# Patient Record
Sex: MALE | Race: OTHER | NOT HISPANIC OR LATINO | URBAN - METROPOLITAN AREA
[De-identification: names, ages, dates, MRNs, and addresses within clinical notes are randomized per-mention and may not be internally consistent; named-entity substitution may affect disease eponyms.]

---

## 2019-02-02 ENCOUNTER — EMERGENCY (EMERGENCY)
Facility: HOSPITAL | Age: 74
LOS: 1 days | Discharge: HOME | End: 2019-02-02
Attending: EMERGENCY MEDICINE | Admitting: EMERGENCY MEDICINE
Payer: SELF-PAY

## 2019-02-02 VITALS
RESPIRATION RATE: 18 BRPM | OXYGEN SATURATION: 98 % | TEMPERATURE: 98 F | SYSTOLIC BLOOD PRESSURE: 116 MMHG | HEART RATE: 89 BPM | WEIGHT: 160.06 LBS | DIASTOLIC BLOOD PRESSURE: 74 MMHG

## 2019-02-02 VITALS
HEART RATE: 87 BPM | TEMPERATURE: 99 F | DIASTOLIC BLOOD PRESSURE: 67 MMHG | SYSTOLIC BLOOD PRESSURE: 115 MMHG | RESPIRATION RATE: 16 BRPM | OXYGEN SATURATION: 99 %

## 2019-02-02 LAB
ALBUMIN SERPL ELPH-MCNC: 2.9 G/DL — LOW (ref 3.4–5)
ALP SERPL-CCNC: 117 U/L — SIGNIFICANT CHANGE UP (ref 40–120)
ALT FLD-CCNC: 20 U/L — SIGNIFICANT CHANGE UP (ref 12–42)
ANION GAP SERPL CALC-SCNC: 9 MMOL/L — SIGNIFICANT CHANGE UP (ref 9–16)
AST SERPL-CCNC: 16 U/L — SIGNIFICANT CHANGE UP (ref 15–37)
BASOPHILS NFR BLD AUTO: 0.4 % — SIGNIFICANT CHANGE UP (ref 0–2)
BILIRUB SERPL-MCNC: 0.4 MG/DL — SIGNIFICANT CHANGE UP (ref 0.2–1.2)
BUN SERPL-MCNC: 48 MG/DL — HIGH (ref 7–23)
CALCIUM SERPL-MCNC: 9 MG/DL — SIGNIFICANT CHANGE UP (ref 8.5–10.5)
CHLORIDE SERPL-SCNC: 95 MMOL/L — LOW (ref 96–108)
CO2 SERPL-SCNC: 25 MMOL/L — SIGNIFICANT CHANGE UP (ref 22–31)
CREAT SERPL-MCNC: 2.9 MG/DL — HIGH (ref 0.5–1.3)
EOSINOPHIL NFR BLD AUTO: 0.5 % — SIGNIFICANT CHANGE UP (ref 0–6)
GLUCOSE SERPL-MCNC: 300 MG/DL — HIGH (ref 70–99)
HCT VFR BLD CALC: 41.6 % — SIGNIFICANT CHANGE UP (ref 39–50)
HGB BLD-MCNC: 14 G/DL — SIGNIFICANT CHANGE UP (ref 13–17)
IMM GRANULOCYTES NFR BLD AUTO: 0.5 % — SIGNIFICANT CHANGE UP (ref 0–1.5)
LYMPHOCYTES # BLD AUTO: 13.4 % — SIGNIFICANT CHANGE UP (ref 13–44)
MCHC RBC-ENTMCNC: 25.7 PG — LOW (ref 27–34)
MCHC RBC-ENTMCNC: 33.7 G/DL — SIGNIFICANT CHANGE UP (ref 32–36)
MCV RBC AUTO: 76.5 FL — LOW (ref 80–100)
MONOCYTES NFR BLD AUTO: 9.9 % — SIGNIFICANT CHANGE UP (ref 2–14)
NEUTROPHILS NFR BLD AUTO: 75.3 % — SIGNIFICANT CHANGE UP (ref 43–77)
PLATELET # BLD AUTO: 393 K/UL — SIGNIFICANT CHANGE UP (ref 150–400)
POTASSIUM SERPL-MCNC: 4.3 MMOL/L — SIGNIFICANT CHANGE UP (ref 3.5–5.3)
POTASSIUM SERPL-SCNC: 4.3 MMOL/L — SIGNIFICANT CHANGE UP (ref 3.5–5.3)
PROT SERPL-MCNC: 8 G/DL — SIGNIFICANT CHANGE UP (ref 6.4–8.2)
RBC # BLD: 5.44 M/UL — SIGNIFICANT CHANGE UP (ref 4.2–5.8)
RBC # FLD: 13.1 % — SIGNIFICANT CHANGE UP (ref 10.3–14.5)
SODIUM SERPL-SCNC: 129 MMOL/L — LOW (ref 132–145)
WBC # BLD: 10 K/UL — SIGNIFICANT CHANGE UP (ref 3.8–10.5)
WBC # FLD AUTO: 10 K/UL — SIGNIFICANT CHANGE UP (ref 3.8–10.5)

## 2019-02-02 PROCEDURE — 73700 CT LOWER EXTREMITY W/O DYE: CPT | Mod: 26,RT

## 2019-02-02 PROCEDURE — 93971 EXTREMITY STUDY: CPT | Mod: 26,RT

## 2019-02-02 PROCEDURE — 99285 EMERGENCY DEPT VISIT HI MDM: CPT

## 2019-02-02 RX ORDER — CLOPIDOGREL BISULFATE 75 MG/1
1 TABLET, FILM COATED ORAL
Qty: 0 | Refills: 0 | COMMUNITY

## 2019-02-02 RX ORDER — ESOMEPRAZOLE MAGNESIUM 40 MG/1
0 CAPSULE, DELAYED RELEASE ORAL
Qty: 0 | Refills: 0 | COMMUNITY

## 2019-02-02 RX ORDER — MORPHINE SULFATE 50 MG/1
2 CAPSULE, EXTENDED RELEASE ORAL ONCE
Qty: 0 | Refills: 0 | Status: DISCONTINUED | OUTPATIENT
Start: 2019-02-02 | End: 2019-02-02

## 2019-02-02 RX ORDER — LISINOPRIL 2.5 MG/1
0 TABLET ORAL
Qty: 0 | Refills: 0 | COMMUNITY

## 2019-02-02 RX ORDER — CEFAZOLIN SODIUM 1 G
1000 VIAL (EA) INJECTION ONCE
Qty: 0 | Refills: 0 | Status: COMPLETED | OUTPATIENT
Start: 2019-02-02 | End: 2019-02-02

## 2019-02-02 RX ORDER — KETOROLAC TROMETHAMINE 30 MG/ML
30 SYRINGE (ML) INJECTION ONCE
Qty: 0 | Refills: 0 | Status: DISCONTINUED | OUTPATIENT
Start: 2019-02-02 | End: 2019-02-02

## 2019-02-02 RX ORDER — ATORVASTATIN CALCIUM 80 MG/1
0 TABLET, FILM COATED ORAL
Qty: 0 | Refills: 0 | COMMUNITY

## 2019-02-02 RX ORDER — CEPHALEXIN 500 MG
1 CAPSULE ORAL
Qty: 20 | Refills: 0 | OUTPATIENT
Start: 2019-02-02 | End: 2019-02-11

## 2019-02-02 RX ADMIN — Medication 30 MILLIGRAM(S): at 13:05

## 2019-02-02 RX ADMIN — Medication 100 MILLIGRAM(S): at 16:14

## 2019-02-02 RX ADMIN — MORPHINE SULFATE 2 MILLIGRAM(S): 50 CAPSULE, EXTENDED RELEASE ORAL at 13:06

## 2019-02-02 RX ADMIN — Medication 30 MILLIGRAM(S): at 16:14

## 2019-02-02 RX ADMIN — MORPHINE SULFATE 2 MILLIGRAM(S): 50 CAPSULE, EXTENDED RELEASE ORAL at 16:14

## 2019-02-02 RX ADMIN — Medication 1000 MILLIGRAM(S): at 16:46

## 2019-02-02 NOTE — ED PROVIDER NOTE - OBJECTIVE STATEMENT
Present for HPI: Ale Darby (scribe), Kaiden Fagan (pt)     Nurse's Note: "here for right upper leg pain, denies injury or trauma - as per EMS pt is unable to bear weight h/o dm, htn"    73 y o, homeless, male with PMHx of DM (Janumet), HTN, MI, and PSHX of cardiac surgery (2005- [facility]) was BIBA for right medial thigh pain x1 week.  Notes mild improvement since initial onset but states that pain is still present. States he thinks pain may have been caused by a direct hit to site while crossing a subway turnstile. Pt does medical care check ups at [facility] but denies seeing a doctor for current complaint. Notes difficulty walking secondary to pain and chills. No fevers, chest pain, SOB, numbness, tingling, edema, back pain, or other sx.

## 2019-02-02 NOTE — ED PROVIDER NOTE - NSFOLLOWUPINSTRUCTIONS_ED_ALL_ED_FT
1)  PLEASE FOLLOW-UP WITH YOUR PRIMARY CARE DOCTOR IN 1-2 DAYS at [place]    2)  BRING ALL PAPERWORK FROM TODAY'S VISIT TO YOUR FOLLOW-UP VISIT.  IF YOU DO NOT HAVE A PRIMARY CARE DOCTOR PLEASE REFER TO THE OFFICE/CLINIC INFORMATION GIVEN ABOVE.  YOU MAY ALSO CALL [phone] AND ASK FOR MS. SHEMAR KULKARNI.  SHE CAN HELP YOU MAKE A FOLLOW-UP APPOINTMENT.  HER HOURS ARE 11AM-7PM MONDAY - FRIDAY.    3) PLEASE RETURN TO THE ER IMMEDIATELY OR CALL 911 FOR ANY HIGH FEVER, TROUBLE BREATHING, VOMITING, SEVERE PAIN, WORSENING OF LEG SYMPTOMS , FEVER OR ANY OTHER CONCERNS.

## 2019-02-02 NOTE — ED ADULT TRIAGE NOTE - CHIEF COMPLAINT QUOTE
here for right upper leg pain, denies injury or trauma - as per EMS pt is unable to bear weight h/o dm, htn

## 2019-02-02 NOTE — ED PROVIDER NOTE - MEDICAL DECISION MAKING DETAILS
73 y o male w/ cardaic hx presents with right inguinal to right medial thigh tenderness. No edema. Analgesics ordered. Check labs. CT of right thigh ordered to check for DVT or inguinal absess. US of right leg ordered. 73 y o male w/ cardiac hx presents with right inguinal to right medial thigh tenderness. No edema. Analgesics ordered. Check labs. CT of right thigh ordered to check for DVT or inguinal abscess. US of right leg ordered.  Strict prompt return precautions to the Emergency Room discussed for any worsening or new symptoms. The patient verbalized understanding of these precautions and need to return. The patient tolerated PO fluids.  . At the time of discharge from the Emergency Department, the patient is alert with fluent appropriate speech and ambulatory without difficulty.  A medical screening examination was performed and no emergency medical condition was identified.    I have discussed the discharge plan with the patient. The patient agrees with the plan, as discussed.  The patient understands Emergency Department diagnosis is a preliminary diagnosis often based on limited information and that the patient must adhere to the follow-up plan as discussed.  The patient understands that if the symptoms worsen or if prescribed medications do not have the desired/planned effect that the patient may return to the Emergency Department at any time for further evaluation and treatment.

## 2019-02-02 NOTE — ED PROVIDER NOTE - MUSCULOSKELETAL, MLM
Left leg ROM fully intact. Right leg ROM limited secondary to pain, tenderness in right medial thigh from right knee to right inguinal area. Left leg ROM fully intact. + tenderness / mild erythema / induration - no fluctuance right medial thigh from inguinal extending down medial thigh - approx 3cm x 10 cm no crepitus

## 2019-02-06 DIAGNOSIS — M79.651 PAIN IN RIGHT THIGH: ICD-10-CM

## 2019-02-06 DIAGNOSIS — E11.9 TYPE 2 DIABETES MELLITUS WITHOUT COMPLICATIONS: ICD-10-CM

## 2019-02-06 DIAGNOSIS — I10 ESSENTIAL (PRIMARY) HYPERTENSION: ICD-10-CM

## 2019-02-06 DIAGNOSIS — Z79.4 LONG TERM (CURRENT) USE OF INSULIN: ICD-10-CM

## 2019-02-06 DIAGNOSIS — L03.113 CELLULITIS OF RIGHT UPPER LIMB: ICD-10-CM

## 2019-02-06 DIAGNOSIS — Z79.02 LONG TERM (CURRENT) USE OF ANTITHROMBOTICS/ANTIPLATELETS: ICD-10-CM

## 2019-02-06 DIAGNOSIS — Z79.899 OTHER LONG TERM (CURRENT) DRUG THERAPY: ICD-10-CM

## 2024-07-29 ENCOUNTER — EMERGENCY (EMERGENCY)
Facility: HOSPITAL | Age: 79
LOS: 1 days | Discharge: HOME | End: 2024-07-29
Attending: EMERGENCY MEDICINE | Admitting: EMERGENCY MEDICINE
Payer: MEDICAID

## 2024-07-29 VITALS
OXYGEN SATURATION: 96 % | HEART RATE: 80 BPM | TEMPERATURE: 98 F | SYSTOLIC BLOOD PRESSURE: 135 MMHG | DIASTOLIC BLOOD PRESSURE: 74 MMHG | RESPIRATION RATE: 18 BRPM

## 2024-07-29 VITALS
DIASTOLIC BLOOD PRESSURE: 91 MMHG | HEART RATE: 78 BPM | TEMPERATURE: 98 F | SYSTOLIC BLOOD PRESSURE: 152 MMHG | RESPIRATION RATE: 18 BRPM | OXYGEN SATURATION: 98 %

## 2024-07-29 DIAGNOSIS — S50.312A ABRASION OF LEFT ELBOW, INITIAL ENCOUNTER: ICD-10-CM
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
## 2024-07-29 DIAGNOSIS — S50.02XA CONTUSION OF LEFT ELBOW, INITIAL ENCOUNTER: ICD-10-CM

## 2024-07-29 DIAGNOSIS — Z79.02 LONG TERM (CURRENT) USE OF ANTITHROMBOTICS/ANTIPLATELETS: ICD-10-CM

## 2024-07-29 DIAGNOSIS — I25.10 ATHEROSCLEROTIC HEART DISEASE OF NATIVE CORONARY ARTERY WITHOUT ANGINA PECTORIS: ICD-10-CM

## 2024-07-29 PROBLEM — I21.9 ACUTE MYOCARDIAL INFARCTION, UNSPECIFIED: Chronic | Status: ACTIVE | Noted: 2019-02-02

## 2024-07-29 PROBLEM — E11.9 TYPE 2 DIABETES MELLITUS WITHOUT COMPLICATIONS: Chronic | Status: ACTIVE | Noted: 2019-02-02

## 2024-07-29 PROBLEM — I10 ESSENTIAL (PRIMARY) HYPERTENSION: Chronic | Status: ACTIVE | Noted: 2019-02-02

## 2024-07-29 PROCEDURE — 99284 EMERGENCY DEPT VISIT MOD MDM: CPT

## 2024-07-29 PROCEDURE — 73590 X-RAY EXAM OF LOWER LEG: CPT | Mod: 26,LT

## 2024-07-29 PROCEDURE — 73630 X-RAY EXAM OF FOOT: CPT | Mod: 26,50

## 2024-07-29 PROCEDURE — 73080 X-RAY EXAM OF ELBOW: CPT | Mod: 26,LT

## 2024-07-29 RX ADMIN — Medication 600 MILLIGRAM(S): at 06:59

## 2024-07-29 NOTE — ED PROVIDER NOTE - OBJECTIVE STATEMENT
78-year-old male with past medical history of CAD on Plavix presenting to the emergency room for upper and lower extremity injury.  Patient states that he is asleep walker, [place] states that he started sleepwalking and then lowered himself onto the train tracks accidentally, states that he fell injuring his left elbow.  States that while he slid onto the tracks, he landed on his feet, complaining of feet pain bilaterally as well as left leg pain.  States the majority of the pain is in his elbow.  Patient denies hitting head.

## 2024-07-29 NOTE — ED ADULT NURSE NOTE - NSFALLRISKASMT_ED_ALL_ED_DT
SUBJECTIVE: Patient states she is still sore from her fall but soreness has improved since last visit.     OBJECTIVE: Patient continues with right knee pain due to fall last week. she is able to stand today but not able to safely take steps due to pain and weakness.    MEDICATION CHANGES: none    FALLS SINCE LAST VISIT: none    CHANGES TO INSURANCE: none    MENTAL STATUS: alert and oriented    PAIN: 5-6/10 right knee    EDEMA: B LE's    SUPPLEMENTAL OXYGEN: NO    PLAN: attempt gait and transfer 29-Jul-2024 06:29

## 2024-07-29 NOTE — ED PROVIDER NOTE - PATIENT PORTAL LINK FT
You can access the FollowMyHealth Patient Portal offered by [facility] by registering at the following website: [email or web address]. By joining [employer]’s FollowMyHealth portal, you will also be able to view your health information using other applications (apps) compatible with our system.

## 2024-07-29 NOTE — ED ADULT TRIAGE NOTE - CHIEF COMPLAINT QUOTE
[place] of fall [place] subway platform [place] train tracks. Patient reports he sleep walks and believes he sleepwalked and slid himself down onto tracks and fell. Abrasion noted to left elbow and pain to left ankle and knee. Denies head injury or LOC. Endorses that he takes plavix.

## 2024-07-29 NOTE — ED PROVIDER NOTE - PHYSICAL EXAMINATION
Constitutional:  Well appearing, awake, alert, oriented  and in no apparent distress.  HEENT: Airway patent, Nasal mucosa clear. Mouth with normal mucosa. No evidence of head trauma  Eyes: Clear bilaterally, pupils equal, round and reactive to light.  Cardiac: Normal rate, regular rhythm.  Respiratory: Breath sounds clear and equal bilaterally.  GI: Abdomen soft, non-tender, no guarding.  MSK: Spine appears normal, range of motion is not limited, no muscle or joint tenderness  Neuro: Alert and oriented, no focal deficits, no motor or sensory deficits.  Skin: Skin normal color for race, warm, dry and intact. No evidence of rash.  L elbow with abrasions, normal range of motion, no crepitus; Biltaerally feet and ankle exam normal, generalized tendrerness, no deformity

## 2024-07-29 NOTE — ED PROVIDER NOTE - NSFOLLOWUPINSTRUCTIONS_ED_ALL_ED_FT
Contusion  A contusion is a deep bruise. Contusions are the result of a blunt injury to tissues and muscle fibers under the skin. The injury causes bleeding under the skin. The skin over the contusion may turn blue, purple, or yellow. Minor injuries will give you a painless contusion, but more severe injuries cause contusions that can stay painful and swollen for a few weeks.    Follow these instructions at home:  Pay attention to any changes in your symptoms. Let your health care provider know about them. Take these actions to relieve your pain.    Managing pain, stiffness, and swelling    Bag of ice on a towel on the skin.  Use resting, icing, applying pressure (compression), and raising (elevating) the injured area. This is often called the RICE method.  Rest the injured area. Return to your normal activities as told by your health care provider. Ask your health care provider what activities are safe for you.  If directed, put ice on the injured area. To do this:  Put ice in a plastic bag.  Place a towel between your skin and the bag.  Leave the ice on for 20 minutes, 2–3 times a day.  If your skin turns bright red, remove the ice right away to prevent skin damage. The risk of skin damage is higher if you cannot feel pain, heat, or cold.  If directed, apply light compression to the injured area using an elastic bandage. Make sure the bandage is not wrapped too tightly. Remove and reapply the bandage as directed by your health care provider.  If possible, elevate the injured area above the level of your heart while you are sitting or lying down.  General instructions    Take over-the-counter and prescription medicines only as told by your health care provider.  Keep all follow-up visits. Your health care provider may want to see how your contusion is healing with treatment.  Contact a health care provider if:  Your symptoms do not improve after several days of treatment.  Your symptoms get worse.  You have difficulty moving the injured area.  Get help right away if:  You have severe pain.  You have numbness in a hand or foot.  Your hand or foot turns pale or cold.  This information is not intended to replace advice given to you by your health care provider. Make sure you discuss any questions you have with your health care provider.    Document Revised: 06/05/2023 Document Reviewed: 06/05/2023  Elsevier Patient Education © 2024 [employer]

## 2024-07-29 NOTE — ED ADULT NURSE NOTE - OBJECTIVE STATEMENT
. Patient reports he sleep walks and believes he sleepwalked and slid himself down onto tracks and fell. Abrasion noted to left elbow and pain to left ankle and knee

## 2024-07-29 NOTE — ED PROVIDER NOTE - CLINICAL SUMMARY MEDICAL DECISION MAKING FREE TEXT BOX
A/P: 78-year-old male with past medical history of CAD on Plavix presenting to the emergency room for upper and lower extremity injury.  Patient states that he is asleep walker, [place] states that he started sleepwalking and then lowered himself onto the train tracks accidentally, states that he fell injuring his left elbow.  States that while he slid onto the tracks, he landed on his feet, complaining of feet pain bilaterally as well as left leg pain.  States the majority of the pain is in his elbow.  Patient denies hitting head.  --Patient was fully undressed, no signs of head trauma, patient denies any head  -- Plan to obtain x-ray imaging of elbow and lower extremities due to complaints of pain  --Ibuprofen for pain
